# Patient Record
Sex: FEMALE | Race: WHITE
[De-identification: names, ages, dates, MRNs, and addresses within clinical notes are randomized per-mention and may not be internally consistent; named-entity substitution may affect disease eponyms.]

---

## 2018-09-02 ENCOUNTER — HOSPITAL ENCOUNTER (EMERGENCY)
Dept: HOSPITAL 76 - ED | Age: 38
Discharge: HOME | End: 2018-09-02
Payer: COMMERCIAL

## 2018-09-02 VITALS — SYSTOLIC BLOOD PRESSURE: 112 MMHG | DIASTOLIC BLOOD PRESSURE: 70 MMHG

## 2018-09-02 DIAGNOSIS — S60.222A: Primary | ICD-10-CM
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
## 2018-09-02 PROCEDURE — 99282 EMERGENCY DEPT VISIT SF MDM: CPT

## 2018-09-02 PROCEDURE — 99283 EMERGENCY DEPT VISIT LOW MDM: CPT

## 2018-09-02 NOTE — ED PHYSICIAN DOCUMENTATION
PD HPI UPPER EXT INJURY





- Stated complaint


Stated Complaint: LT HAND INJ





- Chief complaint


Chief Complaint: Ext Problem





- History obtained from


History obtained from: Patient





- History of Present Illness


Location: Right, Left, Hand


Type of injury: Fall


Where injury occurred: Home


Timing - onset: Last night


Timing - duration: Hours


Timing - details: Abrupt onset, Still present


Improved by: Rest, Immobilization


Worsened by: Moving, Palpating


Associated symptoms: Swelling, Discolored.  No: Weakness, Numbness, Tingling


Contributing factors: No: Anticoagulated


Similar symptoms before: Has not had sx before


Recently seen: Not recently seen





- Additonal information


Additional information: 


38-year-old female fell last night and fell onto both of her palms.  She denies 

any pain in her wrists themselves but she does have pain in the palms of both 

hands and she has a lot more pain in the left hand especially where it is 

swollen over the lateral mid carpals.  She has normal sensation distally she 

has pain to flexion of the fingers.








Review of Systems


Constitutional: denies: Fever


Eyes: denies: Decreased vision


Ears: denies: Ear pain


Nose: denies: Congestion


Respiratory: denies: Cough


GI: denies: Vomiting


: denies: Dysuria


Skin: denies: Rash


Musculoskeletal: reports: Extremity pain.  denies: Neck pain, Back pain


Neurologic: denies: Generalized weakness, Focal weakness, Numbness





PD PAST MEDICAL HISTORY





- Past Medical History


Past Medical History: No


Psych: Anxiety





- Past Surgical History


Past Surgical History: No





- Present Medications


Home Medications: 


 Ambulatory Orders











 Medication  Instructions  Recorded  Confirmed


 


Citalopram [CeleXA] 2 tab PO DAILY 09/02/18 09/02/18














- Allergies


Allergies/Adverse Reactions: 


 Allergies











Allergy/AdvReac Type Severity Reaction Status Date / Time


 


No Known Drug Allergies Allergy   Verified 09/02/18 10:35














- Social History


Does the pt smoke?: No


Smoking Status: Never smoker


Does the pt drink ETOH?: Yes


Does the pt have substance abuse?: No





- Immunizations


Immunizations are current?: Yes


Immunizations: TDAP >10years/unknown





- POLST


Patient has POLST: No





PD ED PE NORMAL





- Vitals


Vital signs reviewed: Yes (hypertensive mild )





- General


General: Alert and oriented X 3, No acute distress, Well developed/nourished





- HEENT


HEENT: Atraumatic, PERRL, EOMI





- Respiratory


Respiratory: No respiratory distress





- Derm


Derm: Normal color, Warm and dry, No rash





- Extremities


Extremities: No deformity, No edema, Other (There are superficial abrasions to 

the palms worse on the right. There is swelling and ecchymosis over the dorsum 

of the left hand laterally. The distal n/v is intact and the function is 

intact. )





- Neuro


Neuro: Alert and oriented X 3, CNs 2-12 intact, No motor deficit, No sensory 

deficit, Normal speech


Eye Opening: Spontaneous


Motor: Obeys Commands


Verbal: Oriented


GCS Score: 15





- Psych


Psych: Normal mood, Normal affect





Results





- Vitals


Vitals: 





 Vital Signs - 24 hr











  09/02/18





  10:32


 


Temperature 36.5 C


 


Heart Rate 91


 


Respiratory 16





Rate 


 


Blood Pressure 138/73 H


 


O2 Saturation 98








 Oxygen











O2 Source                      Room air

















- Rads (name of study)


  ** left hand


Radiology: Prelim report reviewed, EMP read indepedently, See rad report (

Impression: Negative left hand.)





PD MEDICAL DECISION MAKING





- ED course


Complexity details: reviewed results, re-evaluated patient, considered 

differential, d/w patient


ED course: 





38-year-old female with a fall onto her hands with a contusion of both hands 

worse on the left than the right she has no evidence of fracture she is placed 

into a splint for comfort.  She has local wound care to her abrasions.





- Sepsis Event


Vital Signs: 





 Vital Signs - 24 hr











  09/02/18





  10:32


 


Temperature 36.5 C


 


Heart Rate 91


 


Respiratory 16





Rate 


 


Blood Pressure 138/73 H


 


O2 Saturation 98








 Oxygen











O2 Source                      Room air

















Departure





- Departure


Disposition: 01 Home, Self Care


Clinical Impression: 


Hand contusion


Qualifiers:


 Encounter type: initial encounter Laterality: left Qualified Code(s): S60.222A 

- Contusion of left hand, initial encounter





Condition: Stable


Instructions:  ED Contusion Hand


Follow-Up: 


Tom Fang MD [Primary Care Provider] -

## 2018-09-02 NOTE — XRAY REPORT
Reason:  injury/pain

Procedure Date:  09/02/2018   

Accession Number:  [id number] / [id number]                    

Procedure:  XR  - Hand 3 View LT CPT Code:  

 

FULL RESULT:

 

 

EXAM:

LEFT HAND RADIOGRAPHY

 

EXAM DATE: 9/2/2018 11:02 AM.

 

CLINICAL HISTORY: Injury/pain.

 

COMPARISON: None available.

 

TECHNIQUE: 3 views.

 

FINDINGS:

Bones: Normal. No fractures or bone lesions.

 

Joints: Normal. No subluxations.

 

Soft Tissues: Normal. No soft tissue swelling.

IMPRESSION: Negative left hand.

 

[facility]